# Patient Record
Sex: FEMALE | Race: ASIAN
[De-identification: names, ages, dates, MRNs, and addresses within clinical notes are randomized per-mention and may not be internally consistent; named-entity substitution may affect disease eponyms.]

---

## 2019-11-27 ENCOUNTER — HOSPITAL ENCOUNTER (EMERGENCY)
Dept: HOSPITAL 74 - FER | Age: 54
Discharge: HOME | End: 2019-11-27
Payer: COMMERCIAL

## 2019-11-27 VITALS — SYSTOLIC BLOOD PRESSURE: 124 MMHG | DIASTOLIC BLOOD PRESSURE: 55 MMHG

## 2019-11-27 VITALS — TEMPERATURE: 98 F | HEART RATE: 50 BPM

## 2019-11-27 VITALS — BODY MASS INDEX: 25.6 KG/M2

## 2019-11-27 DIAGNOSIS — S39.012A: Primary | ICD-10-CM
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
## 2019-11-27 DIAGNOSIS — I10: ICD-10-CM

## 2019-11-27 DIAGNOSIS — E78.5: ICD-10-CM
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
## 2019-11-27 DIAGNOSIS — E11.9: ICD-10-CM
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
## 2019-11-27 LAB
ALBUMIN SERPL-MCNC: 4.2 G/DL (ref 3.4–5)
ALP SERPL-CCNC: 62 U/L (ref 45–117)
ALT SERPL-CCNC: 23 U/L (ref 13–61)
ANION GAP SERPL CALC-SCNC: 4 MMOL/L (ref 8–16)
AST SERPL-CCNC: 18 U/L (ref 15–37)
BASOPHILS # BLD: 0.5 % (ref 0–2)
BILIRUB SERPL-MCNC: 0.2 MG/DL (ref 0.2–1)
BUN SERPL-MCNC: 13 MG/DL (ref 7–18)
CALCIUM SERPL-MCNC: 9 MG/DL (ref 8.5–10)
CHLORIDE SERPL-SCNC: 109 MMOL/L (ref 98–107)
CO2 SERPL-SCNC: 24 MMOL/L (ref 21–32)
CREAT SERPL-MCNC: 0.7 MG/DL (ref 0.55–1.3)
DEPRECATED RDW RBC AUTO: 13.6 % (ref 11.6–15.6)
EOSINOPHIL # BLD: 3.1 % (ref 0–4.5)
EPITH CASTS URNS QL MICRO: (no result) /HPF
GLUCOSE SERPL-MCNC: 111 MG/DL (ref 74–106)
HCT VFR BLD CALC: 39.9 % (ref 32.4–45.2)
HGB BLD-MCNC: 12.9 GM/DL (ref 10.7–15.3)
LYMPHOCYTES # BLD: 42.9 % (ref 8–40)
MCH RBC QN AUTO: 25.4 PG (ref 25.7–33.7)
MCHC RBC AUTO-ENTMCNC: 32.3 G/DL (ref 32–36)
MCV RBC: 78.6 FL (ref 80–96)
MONOCYTES # BLD AUTO: 7.2 % (ref 3.8–10.2)
NEUTROPHILS # BLD: 46.3 % (ref 42.8–82.8)
PLATELET # BLD AUTO: 286 K/MM3 (ref 134–434)
PMV BLD: 7.8 FL (ref 7.5–11.1)
POTASSIUM SERPLBLD-SCNC: 4.3 MMOL/L (ref 3.5–5.1)
PROT SERPL-MCNC: 7.8 G/DL (ref 6.4–8.2)
RBC # BLD AUTO: 5.08 M/MM3 (ref 3.6–5.2)
SODIUM SERPL-SCNC: 137 MMOL/L (ref 136–145)
WBC # BLD AUTO: 8.8 K/MM3 (ref 4–10.8)

## 2019-11-27 NOTE — PDOC
Documentation entered by Gia Villanueva SCRIBE, acting as scribe for Juan Carlos Gibbs MD.








Juan Carlos Richter MD:  This documentation has been prepared by the Rich paul Aiswarya, SCRIBE, under my direction and personally reviewed by me in 

its entirety.  I confirm that the documentation accurately reflects all work, 

treatment, procedures, and medical decision making performed by me.  





History of Present Illness





- General


Chief Complaint: Pain


Stated Complaint: BACK AND LEG PAIN  3 DAYS


History Source: Patient


Exam Limitations: No Limitations





- History of Present Illness


Initial Comments: 





11/27/19 17:11


The patient is a 53 year old female, with a significant PMH of diabetes,HDL and 

mild HTN, who presents to the emergency department with back and leg pain that 

began 3 days ago. Per patient's son, patient endorses right sided back pain 

that radiates to the right leg with a severity of  9/10. Patient states pain is 

exacerbated when bending down and alleviated when lying down. No medication was 

given for the pain. Patient denies any numbness or tingling. Denies any pain of 

flexion and extension. Denies falls, head trauma or any other injuries. Denies 

chest pain, shortness of breath, headache and dizziness.





Allergies: NKDA


Past surgical history: None reported


Social history: None reported


PCP: Tree Trujillo 








Past History





- Past Medical History


Allergies/Adverse Reactions: 


 Allergies











Allergy/AdvReac Type Severity Reaction Status Date / Time


 


No Known Allergies Allergy   Unverified 11/27/19 16:18











Home Medications: 


Ambulatory Orders





Cyclobenzaprine HCl [Flexeril] 10 mg PO TID #15 tablet 11/27/19 


Diclofenac Sodium 50 mg PO BID #10 tablet. 11/27/19 


Gabapentin 100 mg PO DAILY 11/27/19 


Glipizide [Glipizide Xl] 2.5 mg PO DAILY 11/27/19 


Metformin HCl [Glucophage] 1,000 mg PO DAILY 11/27/19 











**Review of Systems





- Review of Systems


Able to Perform ROS?: Yes


Comments:: 





11/27/19 17:12


GENERAL/CONSTITUTIONAL: No fever or chills. No weakness.


HEAD, EYES, EARS, NOSE AND THROAT: No change in vision. No ear pain or 

discharge. No sore throat.


CARDIOVASCULAR: No chest pain or shortness of breath.


RESPIRATORY: No cough, wheezing, or hemoptysis.


GASTROINTESTINAL: No nausea, vomiting, diarrhea or constipation.


GENITOURINARY: No dysuria, frequency, or change in urination.


MUSCULOSKELETAL:+right sided back pain and leg pain.  


SKIN: No rash


NEUROLOGIC: No headache, vertigo, loss of consciousness, or change in strength/

sensation.


ENDOCRINE: No increased thirst. No abnormal weight change.


HEMATOLOGIC/LYMPHATIC: No anemia, easy bleeding, or history of blood clots.


ALLERGIC/IMMUNOLOGIC: No hives or skin allergy.








*Physical Exam





- Vital Signs


 Last Vital Signs











Temp Pulse Resp BP Pulse Ox


 


 98 F   50 L  16   131/63   98 


 


 11/27/19 16:13  11/27/19 16:13  11/27/19 16:13  11/27/19 16:13  11/27/19 16:13














- Physical Exam


Comments: 





11/27/19 17:12


GENERAL: Awake, alert, and fully oriented, in no acute distress


HEAD: No signs of trauma


EYES: PERRLA, EOMI, sclera anicteric, conjunctiva clear


LUNGS: Breath sounds equal, clear to auscultation bilaterally.  No wheezes, and 

no crackles


HEART: Regular rate and rhythm, normal S1 and S2, no murmurs, rubs or gallops


ABDOMEN: Soft, nontender, normoactive bowel sounds.  No guarding, no rebound.  

No masses


EXTREMITIES: +Mild tenderness on right sacrum. Straight leg raise was negative. 

No distal motor sensory deficit. Gait stable and no deformity.


NEUROLOGICAL: Cranial nerves II through XII grossly intact.  Normal speech.


SKIN: Warm, Dry, normal turgor, no rashes or lesions noted.








ED Treatment Course





- LABORATORY


CBC & Chemistry Diagram: 


 11/27/19 16:55





 11/27/19 16:55





Medical Decision Making





- Medical Decision Making





11/27/19 16:48


53-year-old female with low back pain for 3 days.  Pain is localized over the 

right sacrum and radiates to the right anterior thigh.  No numbness tingling or 

weakness.  Aggravated with ambulation, but walking without difficulty.





Patient has had minor back pain in the past.  She is not taking any pain 

medication to her knowledge, but she recently filled the prescription for 

gabapentin along with her usual diabetes medications.  She has had non-insulin-

dependent diabetes for several years.  No other known illnesses.





Vital signs are normal.  Examination of the back is normal with no deformities 

or inflammation of the lumbosacral spine.  No point tenderness to palpation.  

Straight leg raising is negative.  Pulses full.  No distal sensory or motor 

deficits.


Chest, cardiac, and abdominal exams are negative except for a regular 

bradycardia 55/min.





Urinalysis, CBC, and chemistries.  Analgesics, muscle relaxants, and follow-up 

back specialist if no improvement.


11/27/19 16:51


Spoke to primary physician by phone, Dr. Ronnie Leavitt.  Further history was 

obtained.  The patient apparently has thyroid disease, elevated cholesterol, non

-insulin-dependent diabetes, and mild hypertension.  She has diabetic 

neuropathy for which she takes gabapentin.  She is also on metformin, glipizide

, and omeprazole.  Her blood pressure and glucose have been well controlled.  

She injured her back several days ago upon arising quickly, and this has 

worsened.  Informed of mild bradycardia and he will follow.








11/27/19 17:59





11/27/19 18:01


Glucose is 111.  Remainder of labs are without significant abnormalities except 

for white blood cells in the urine.  A culture was sent.  No treatment until 

culture results available, since the patient is asymptomatic and the quality of 

the specimen uncertain.  Additionally, the back pain is in the sacral area and 

not in the area of the kidneys.





Discharge





- Discharge Information


Problems reviewed: Yes


Clinical Impression/Diagnosis: 


Low back strain


Qualifiers:


 Encounter type: initial encounter Qualified Code(s): S39.012A - Strain of 

muscle, fascia and tendon of lower back, initial encounter





Condition: Improved


Disposition: HOME





- Admission


No





- Additional Discharge Information


Prescriptions: 


Cyclobenzaprine HCl [Flexeril] 10 mg PO TID #15 tablet


Diclofenac Sodium 50 mg PO BID #10 tablet.





- Follow up/Referral


Referrals: 


Jeison Prince MD [Staff Physician] - 1 week





- Patient Discharge Instructions


Patient Printed Discharge Instructions:  DI for Low Back Pain


Additional Instructions: 


Bedrest and limited walking.  Do not sit in a chair or ride in a car.  Sitting 

is the worst position for your back.





Medication as directed.  If no improvement in 3 to 5 days, see Dr. Hernandez or back 

specialist as directed.





- Post Discharge Activity

## 2022-06-29 ENCOUNTER — HOSPITAL ENCOUNTER (EMERGENCY)
Dept: HOSPITAL 74 - FER | Age: 57
Discharge: HOME | End: 2022-06-29
Payer: COMMERCIAL

## 2022-06-29 VITALS — SYSTOLIC BLOOD PRESSURE: 122 MMHG | DIASTOLIC BLOOD PRESSURE: 63 MMHG | HEART RATE: 66 BPM | TEMPERATURE: 99.1 F

## 2022-06-29 VITALS — BODY MASS INDEX: 27.4 KG/M2

## 2022-06-29 DIAGNOSIS — K08.89: Primary | ICD-10-CM

## 2022-07-07 ENCOUNTER — HOSPITAL ENCOUNTER (EMERGENCY)
Dept: HOSPITAL 74 - FER | Age: 57
Discharge: HOME | End: 2022-07-07
Payer: COMMERCIAL

## 2022-07-07 VITALS — SYSTOLIC BLOOD PRESSURE: 116 MMHG | TEMPERATURE: 99.3 F | HEART RATE: 71 BPM | DIASTOLIC BLOOD PRESSURE: 66 MMHG

## 2022-07-07 VITALS — BODY MASS INDEX: 27.3 KG/M2

## 2022-07-07 DIAGNOSIS — R10.13: ICD-10-CM

## 2022-07-07 DIAGNOSIS — R50.9: Primary | ICD-10-CM

## 2022-07-07 LAB
ALBUMIN SERPL-MCNC: 3.4 G/DL (ref 3.4–5)
ALP SERPL-CCNC: 72 U/L (ref 45–117)
ALT SERPL-CCNC: 22 U/L (ref 13–61)
ANION GAP SERPL CALC-SCNC: 8 MMOL/L (ref 8–16)
ANISOCYTOSIS BLD QL: (no result)
AST SERPL-CCNC: 21 U/L (ref 15–37)
BILIRUB SERPL-MCNC: 0.6 MG/DL (ref 0.2–1)
BUN SERPL-MCNC: 9 MG/DL (ref 7–18)
CALCIUM SERPL-MCNC: 8.8 MG/DL (ref 8.5–10)
CHLORIDE SERPL-SCNC: 101 MMOL/L (ref 98–107)
CO2 SERPL-SCNC: 24 MMOL/L (ref 21–32)
CREAT SERPL-MCNC: 0.7 MG/DL (ref 0.55–1.3)
DEPRECATED RDW RBC AUTO: 16.4 % (ref 11.6–15.6)
EPITH CASTS URNS QL MICRO: (no result) /HPF
GLUCOSE SERPL-MCNC: 259 MG/DL (ref 74–106)
HCT VFR BLD CALC: 35 % (ref 32.4–45.2)
HGB BLD-MCNC: 12.1 G/DL (ref 10.7–15.3)
MCH RBC QN AUTO: 25.5 PG (ref 25.7–33.7)
MCHC RBC AUTO-ENTMCNC: 34.5 G/DL (ref 32–36)
MCV RBC: 73.9 FL (ref 80–96)
PLATELET # BLD AUTO: 111.2 10^3/UL (ref 134–434)
PLATELET BLD QL SMEAR: (no result)
PMV BLD: 7.8 FL (ref 7.5–11.1)
PROT SERPL-MCNC: 7.4 G/DL (ref 6.4–8.2)
RBC # BLD AUTO: 4.73 10^6/UL (ref 3.6–5.2)
SODIUM SERPL-SCNC: 133 MMOL/L (ref 136–145)
WBC # BLD AUTO: 5.5 10^3/UL (ref 4–10.8)

## 2022-07-07 PROCEDURE — 3E033NZ INTRODUCTION OF ANALGESICS, HYPNOTICS, SEDATIVES INTO PERIPHERAL VEIN, PERCUTANEOUS APPROACH: ICD-10-PCS | Performed by: EMERGENCY MEDICINE

## 2022-07-07 PROCEDURE — 3E0337Z INTRODUCTION OF ELECTROLYTIC AND WATER BALANCE SUBSTANCE INTO PERIPHERAL VEIN, PERCUTANEOUS APPROACH: ICD-10-PCS | Performed by: EMERGENCY MEDICINE
